# Patient Record
Sex: FEMALE | Race: WHITE | Employment: STUDENT | URBAN - NONMETROPOLITAN AREA
[De-identification: names, ages, dates, MRNs, and addresses within clinical notes are randomized per-mention and may not be internally consistent; named-entity substitution may affect disease eponyms.]

---
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
## 2021-11-17 ENCOUNTER — OFFICE VISIT (OUTPATIENT)
Dept: RHEUMATOLOGY | Age: 21
End: 2021-11-17
Payer: COMMERCIAL

## 2021-11-17 VITALS
HEART RATE: 92 BPM | SYSTOLIC BLOOD PRESSURE: 126 MMHG | DIASTOLIC BLOOD PRESSURE: 72 MMHG | BODY MASS INDEX: 53.92 KG/M2 | OXYGEN SATURATION: 97 % | WEIGHT: 293 LBS | HEIGHT: 62 IN

## 2021-11-17 DIAGNOSIS — L40.50 PSORIATIC ARTHRITIS (HCC): Primary | ICD-10-CM

## 2021-11-17 DIAGNOSIS — G89.29 CHRONIC BILATERAL LOW BACK PAIN WITHOUT SCIATICA: ICD-10-CM

## 2021-11-17 DIAGNOSIS — Z51.81 MEDICATION MONITORING ENCOUNTER: ICD-10-CM

## 2021-11-17 DIAGNOSIS — E66.01 MORBID OBESITY (HCC): ICD-10-CM

## 2021-11-17 DIAGNOSIS — M54.50 CHRONIC BILATERAL LOW BACK PAIN WITHOUT SCIATICA: ICD-10-CM

## 2021-11-17 DIAGNOSIS — L40.9 PSORIASIS: ICD-10-CM

## 2021-11-17 PROCEDURE — G8417 CALC BMI ABV UP PARAM F/U: HCPCS | Performed by: NURSE PRACTITIONER

## 2021-11-17 PROCEDURE — G8427 DOCREV CUR MEDS BY ELIG CLIN: HCPCS | Performed by: NURSE PRACTITIONER

## 2021-11-17 PROCEDURE — G8484 FLU IMMUNIZE NO ADMIN: HCPCS | Performed by: NURSE PRACTITIONER

## 2021-11-17 PROCEDURE — 99214 OFFICE O/P EST MOD 30 MIN: CPT | Performed by: NURSE PRACTITIONER

## 2021-11-17 PROCEDURE — 1036F TOBACCO NON-USER: CPT | Performed by: NURSE PRACTITIONER

## 2021-11-17 ASSESSMENT — ENCOUNTER SYMPTOMS
NAUSEA: 0
EYE ITCHING: 0
SHORTNESS OF BREATH: 1
DIARRHEA: 1
CONSTIPATION: 0
EYE PAIN: 0
BACK PAIN: 1
ABDOMINAL PAIN: 0
TROUBLE SWALLOWING: 0
COUGH: 0
WHEEZING: 1

## 2021-11-17 NOTE — PROGRESS NOTES
[facility] RHEUMATOLOGY FOLLOW UP NOTE       Date Of Service: 11/17/2021  Provider: LEONIDAS Zhou - BARBARA    Name: Edwin Toledo   MRN: [id number]    Chief Complaint(s)     Chief Complaint   Patient presents with    Follow-up        History of Present Illness (HPI)     Edwin Toledo  is a(n)21 y.o. female with a hx of psoriasis, migraine, insulin resistance, morbid obesity here for the f/u evaluation of psoriatic arthritis. Interval hx:    - has not been seen since initial evaluation on 6/15/2020   - increased joint pains for the past month   - saw dermatologist- gave topical cream which is not working-worsened psoriasis    pain affecting the left fingers, knees, ankles, back  Pain on a scale 0-10: 5.5/10  Type of pain: intermittent  Timing:mornings  Aggravating factors: pressure on joints  Alleviating factors: hot shower, cold compression, narpoxen    Associated symptoms:  + swelling/  Redness/ warmth (ankles), + AM stiffness lasting ~ 10 mins    Psoriasis active behind ears, scalp, arms, abdomen, butt, groin, legs, knees    REVIEW OF SYSTEMS: (ROS)    Review of Systems   Constitutional: Negative for fatigue, fever and unexpected weight change. HENT: Negative for congestion and trouble swallowing. Hair loss   Eyes: Negative for pain and itching. Respiratory: Positive for shortness of breath and wheezing. Negative for cough. Cardiovascular: Negative for chest pain and leg swelling. Gastrointestinal: Positive for diarrhea. Negative for abdominal pain, constipation and nausea. Endocrine: Negative for cold intolerance and heat intolerance. Genitourinary: Negative for difficulty urinating, frequency and urgency. Musculoskeletal: Positive for arthralgias, back pain, joint swelling and myalgias. Skin: Positive for rash. Neurological: Positive for numbness and headaches. Negative for dizziness and weakness. Psychiatric/Behavioral: Positive for sleep disturbance.  The patient is nervous/anxious. PAST MEDICAL HISTORY      Past Medical History:   Diagnosis Date    Insulin resistance     Psoriasis        PAST SURGICAL HISTORY      Past Surgical History:   Procedure Laterality Date    TONSILLECTOMY AND ADENOIDECTOMY      TYMPANOSTOMY TUBE PLACEMENT         FAMILY HISTORY      Family History   Problem Relation Age of Onset    Diabetes Mother     Diabetes Maternal Great Grandfather     Cancer Maternal Great Grandmother         unsure of kind    Cancer Maternal Uncle     Cancer Maternal Uncle         lymphoma       SOCIAL HISTORY      Social History     Tobacco History     Smoking Status  Never Smoker    Smokeless Tobacco Use  Never Used          Alcohol History     Alcohol Use Status  Never          Drug Use     Drug Use Status  Never          Sexual Activity     Sexually Active  Not Asked                ALLERGIES   No Known Allergies    CURRENT MEDICATIONS      Current Outpatient Medications   Medication Sig Dispense Refill    topiramate (TOPAMAX) 100 MG tablet TAKE 1 TABLET BY MOUTH TWICE DAILY FOR 30 DAYS      metFORMIN (GLUCOPHAGE-XR) 500 MG extended release tablet TAKE 1 TABLET BY MOUTH TWICE DAILY WITH EVENING MEAL FOR 30 DAYS      D3-50 1.25 MG (49452 UT) CAPS TAKE 1 CAPSULE BY MOUTH ONCE A WEEK      omeprazole (PRILOSEC) 10 MG delayed release capsule Take 10 mg by mouth daily      triamcinolone (KENALOG) 0.1 % cream Apply topically 2 times daily. 453.6 g 0     No current facility-administered medications for this visit. PHYSICAL EXAMINATION / OBJECTIVE   Objective:  /72 (Site: Right Lower Arm, Position: Sitting, Cuff Size: Medium Adult)   Ht 5' 2.01\" (1.575 m)   Wt (!) 368 lb (166.9 kg)   BMI 67.29 kg/m²     Physical Exam  Vitals reviewed. Constitutional:       Appearance: She is well-developed. Cardiovascular:      Rate and Rhythm: Normal rate and regular rhythm.    Pulmonary:      Effort: Pulmonary effort is normal.      Breath sounds: Normal breath sounds. Musculoskeletal:      Cervical back: Normal range of motion and neck supple. Skin:     General: Skin is warm and dry. Findings: Rash (erythematous plaques scalp, posterior ears, arms, legs, abdomen) present. Comments: Onycholysis nails   Neurological:      Mental Status: She is alert and oriented to person, place, and time. Deep Tendon Reflexes: Reflexes are normal and symmetric. Psychiatric:         Thought Content:  Thought content normal.       Upper extremities:    SHOULDERS tender bilat ,   ELBOWS nontender, no swelling,   WRISTS tender left, no swelling,   HANDS/FINGERS nontender, no swelling    Lower extremities:  HIPS tender bilat outer hips  KNEES tender bilat, no swelling  ANKLES nontender, no swelling   FEET : + MTP compression bilat       RAPID 3:   11/17/2021 --- RAPID 3: 1.7 + 5.5 + 0 = 7.2     Remission: <3  Low Disease Activity: <6  Moderate Disease Activity: >=6 and <=12  High Disease Activity: >12     LABS    CBC  Lab Results   Component Value Date    WBC 8.6 06/15/2020    RBC 4.90 06/15/2020    HGB 11.9 06/15/2020    HCT 39.4 06/15/2020    MCV 80.4 06/15/2020    MCH 24.3 06/15/2020    MCHC 30.2 06/15/2020     06/15/2020       CMP  Lab Results   Component Value Date    CALCIUM 9.6 06/15/2020    LABALBU 3.5 06/15/2020    PROT 7.3 06/15/2020     06/15/2020    K 3.8 06/15/2020    CO2 25 06/15/2020     06/15/2020    BUN 10 06/15/2020    CREATININE 0.6 06/15/2020    ALKPHOS 92 06/15/2020    ALT 18 06/15/2020    AST 15 06/15/2020       HgBA1c: No components found for: HGBA1C    No results found for: VITD25      No results found for: ANASCRN  No results found for: SSA  No results found for: SSB  No results found for: ANTI-SMITH  No results found for: DSDNAAB   No results found for: ANTIRNP  No results found for: C3, C4  No results found for: CCPAB  No results found for: RF    No components found for: CANCASCRN, APANCASCRN  Lab Results Component Value Date    SEDRATE 48 (H) 06/15/2020     Lab Results   Component Value Date    CRP 2.81 (H) 06/15/2020       RADIOLOGY:         ASSESSMENT/PLAN    Assessment   Plan     Psoriatic arthritis  - psoriasis, nail changes, arthralgia. Xrays hands with mod joint space narrowing PIPs. - plan to start methotrexate 10 mg PO weekly & folic acid 1 mg daily if labs stable. Side effects: GI intolerance, alopecia, fatigue, low blood counts, increased risk of infection. RARE: nodulosis, increased risk of lymphoma and non melanoma skin cancer. Discussed teratogenic effects and importance of birth control- patient currently with birth control implant. Psoriasis- active   - following with dermatology    Chronic midline low back pain  - xray lumbar spine with possible SI joints changes. Xray SI joints without abnormalities. Morbid obesity   - discussed importance of wt loss    Medication monitoring   - CBC, CMP, sed rate, CRP q 4 weeks x3 w/ MTX start          No follow-ups on file. Electronically signed by LEONIDAS Lu CNP on 11/17/2021 at 3:11 PM    New Prescriptions    No medications on file       Thank you for allowing me to participate in the care of this patient. Please call if there are any questions.

## 2021-11-19 LAB
ALBUMIN SERPL-MCNC: 3.6 G/DL
ALP BLD-CCNC: 82 U/L
ALT SERPL-CCNC: 19 U/L
ANION GAP SERPL CALCULATED.3IONS-SCNC: 14 MMOL/L
AST SERPL-CCNC: 14 U/L
BASOPHILS ABSOLUTE: 0.1 /ΜL
BASOPHILS RELATIVE PERCENT: 0.8 %
BILIRUB SERPL-MCNC: 0.3 MG/DL (ref 0.1–1.4)
BUN BLDV-MCNC: 15 MG/DL
C-REACTIVE PROTEIN: 2.48
CALCIUM SERPL-MCNC: 8.7 MG/DL
CHLORIDE BLD-SCNC: 106 MMOL/L
CO2: 21 MMOL/L
CREAT SERPL-MCNC: 0.57 MG/DL
EOSINOPHILS ABSOLUTE: 0.2 /ΜL
EOSINOPHILS RELATIVE PERCENT: 2.1 %
GFR CALCULATED: 60
GLUCOSE BLD-MCNC: 96 MG/DL
HCT VFR BLD CALC: 36.8 % (ref 36–46)
HEMOGLOBIN: 12.1 G/DL (ref 12–16)
LYMPHOCYTES ABSOLUTE: 2.2 /ΜL
LYMPHOCYTES RELATIVE PERCENT: 21.5 %
MCH RBC QN AUTO: 25 PG
MCHC RBC AUTO-ENTMCNC: 32.9 G/DL
MCV RBC AUTO: 75.8 FL
MONOCYTES ABSOLUTE: 0.5 /ΜL
MONOCYTES RELATIVE PERCENT: 4.9 %
NEUTROPHILS ABSOLUTE: 7.2 /ΜL
NEUTROPHILS RELATIVE PERCENT: 70.7 %
PDW BLD-RTO: 17.1 %
PLATELET # BLD: 295 K/ΜL
PMV BLD AUTO: 8.4 FL
POTASSIUM SERPL-SCNC: 4.2 MMOL/L
RBC # BLD: 4.85 10^6/ΜL
SEDIMENTATION RATE, ERYTHROCYTE: 49
SODIUM BLD-SCNC: 137 MMOL/L
TOTAL PROTEIN: 7
WBC # BLD: 10.2 10^3/ML

## 2021-11-23 RX ORDER — FOLIC ACID 1 MG/1
1 TABLET ORAL DAILY
Qty: 90 TABLET | Refills: 0 | Status: SHIPPED | OUTPATIENT
Start: 2021-11-23

## 2021-11-24 ENCOUNTER — TELEPHONE (OUTPATIENT)
Dept: RHEUMATOLOGY | Age: 21
End: 2021-11-24

## 2021-11-24 NOTE — TELEPHONE ENCOUNTER
----- Message from LEONIDAS Kendall CNP sent at 11/23/2021  9:32 AM EST -----  Inflammatory markers are elevated, otherwise labs stable. The prescription for the methotrexate and folic acid have been sent to the pharmacy. Repeat labs in 4 weeks x3.

## 2022-02-17 ENCOUNTER — TELEPHONE (OUTPATIENT)
Dept: RHEUMATOLOGY | Age: 22
End: 2022-02-17

## 2022-02-17 NOTE — TELEPHONE ENCOUNTER
Nely Floyd  VPM:[id number]/ Dr. Herb Laguna    Patient wants her lab orders to be sent to [facility] in hospitals and she would appreciate a call concerning her Methotrexate Dosage. Please advise patient.

## 2022-07-18 ENCOUNTER — OFFICE VISIT (OUTPATIENT)
Dept: RHEUMATOLOGY | Age: 22
End: 2022-07-18
Payer: COMMERCIAL

## 2022-07-18 VITALS
OXYGEN SATURATION: 97 % | BODY MASS INDEX: 53.92 KG/M2 | HEART RATE: 78 BPM | HEIGHT: 62 IN | SYSTOLIC BLOOD PRESSURE: 124 MMHG | DIASTOLIC BLOOD PRESSURE: 72 MMHG | WEIGHT: 293 LBS

## 2022-07-18 DIAGNOSIS — L40.50 PSORIATIC ARTHRITIS (HCC): Primary | ICD-10-CM

## 2022-07-18 DIAGNOSIS — G89.29 CHRONIC MIDLINE LOW BACK PAIN WITHOUT SCIATICA: ICD-10-CM

## 2022-07-18 DIAGNOSIS — Z51.81 MEDICATION MONITORING ENCOUNTER: ICD-10-CM

## 2022-07-18 DIAGNOSIS — M46.1 DEGENERATIVE JOINT DISEASE OF SACROILIAC JOINT (HCC): ICD-10-CM

## 2022-07-18 DIAGNOSIS — M54.50 CHRONIC MIDLINE LOW BACK PAIN WITHOUT SCIATICA: ICD-10-CM

## 2022-07-18 DIAGNOSIS — L40.9 PSORIASIS: ICD-10-CM

## 2022-07-18 DIAGNOSIS — E66.01 MORBID OBESITY (HCC): ICD-10-CM

## 2022-07-18 PROCEDURE — 99214 OFFICE O/P EST MOD 30 MIN: CPT | Performed by: INTERNAL MEDICINE

## 2022-07-18 PROCEDURE — G8417 CALC BMI ABV UP PARAM F/U: HCPCS | Performed by: INTERNAL MEDICINE

## 2022-07-18 PROCEDURE — G8427 DOCREV CUR MEDS BY ELIG CLIN: HCPCS | Performed by: INTERNAL MEDICINE

## 2022-07-18 PROCEDURE — 1036F TOBACCO NON-USER: CPT | Performed by: INTERNAL MEDICINE

## 2022-07-18 ASSESSMENT — ENCOUNTER SYMPTOMS
COUGH: 0
SHORTNESS OF BREATH: 0
VOMITING: 0
CONSTIPATION: 0
ABDOMINAL PAIN: 0
EYES NEGATIVE: 1
EYE REDNESS: 0
COLOR CHANGE: 0
WHEEZING: 0
GASTROINTESTINAL NEGATIVE: 1
EYE PAIN: 0
NAUSEA: 0
RESPIRATORY NEGATIVE: 1

## 2022-07-18 NOTE — PROGRESS NOTES
[facility] RHEUMATOLOGY FOLLOW UP NOTE       Date Of Service: 7/18/2022  Provider: Broderick Amato DO ,   PCP: Yohannes Meeks MD   Name: Moses Fragoso   MRN: [id number]        History of Present Illness (HPI)     Chief Complaint   Patient presents with    Follow-up     8 month f/u PSA         Lucinda Gonzalez  is a(n)21 y.o. female with a hx of psoriasis, migraine, insulin resistance, morbid obesity here for the f/u evaluation of psoriatic arthritis. Was on Methotrexate 10mg po weekly - helped with skin -- but the medication was not refilles. Psoriasis  -- Continued redness / macular plaques along the arms, abominal regions, bilat hips. - + assocaited itching. Arthralgia pain up to 10/10 over the past week with constant pain in the bilat ankles, and lower back. Pain in the Right shoulder, bilat hips, left knee, right ankle, and neck. Timing: evenings while trying to sleep. Back pain waking patient. Aggravating factors: pressure on joints. Legs & feet - walking/wt bearing. Alleviating factors: hot shower, cold compression, narpoxen    - legs feel like they are giving out with initial wt bearing, and occasion left knee instability   - denies incontinence. REVIEW OF SYSTEMS: (ROS)    Review of Systems   Constitutional:  Positive for fatigue. Negative for fever and unexpected weight change. HENT:  Positive for tinnitus. Negative for congestion and mouth sores. Eyes: Negative. Negative for pain and redness. Respiratory: Negative. Negative for cough, shortness of breath and wheezing. Cardiovascular:  Positive for leg swelling. Negative for chest pain. Gastrointestinal: Negative. Negative for abdominal pain, constipation, nausea and vomiting. Endocrine: Negative for polyuria. Genitourinary: Negative. Negative for difficulty urinating, frequency and hematuria. Skin: Negative. Negative for color change and rash. Neurological: Negative.   Negative for dizziness, weakness, numbness and headaches. Hematological: Negative. Negative for adenopathy. Does not bruise/bleed easily. Psychiatric/Behavioral:  Positive for sleep disturbance. Negative for agitation. The patient is nervous/anxious. PmHx:  has a past medical history of Insulin resistance and Psoriasis. Social History:  reports that she has never smoked. She has never used smokeless tobacco. She reports that she does not drink alcohol and does not use drugs. No Known Allergies    CURRENT MEDICATIONS      Current Outpatient Medications:     topiramate (TOPAMAX) 100 MG tablet, TAKE 1 TABLET BY MOUTH TWICE DAILY FOR 30 DAYS, Disp: , Rfl:     metFORMIN (GLUCOPHAGE-XR) 500 MG extended release tablet, TAKE 1 TABLET BY MOUTH TWICE DAILY WITH EVENING MEAL FOR 30 DAYS, Disp: , Rfl:     D3-50 1.25 MG (05001 UT) CAPS, TAKE 1 CAPSULE BY MOUTH ONCE A WEEK, Disp: , Rfl:     omeprazole (PRILOSEC) 10 MG delayed release capsule, Take 10 mg by mouth daily, Disp: , Rfl:     triamcinolone (KENALOG) 0.1 % cream, Apply topically 2 times daily. , Disp: 453.6 g, Rfl: 0    methotrexate (RHEUMATREX) 2.5 MG chemo tablet, Take 4 tablets by mouth once a week (Patient not taking: Reported on 7/18/2022), Disp: 16 tablet, Rfl: 0    folic acid (FOLVITE) 1 MG tablet, Take 1 tablet by mouth daily (Patient not taking: Reported on 7/18/2022), Disp: 90 tablet, Rfl: 0      PHYSICAL EXAMINATION / OBJECTIVE     Objective:  /72 (Site: Left Lower Arm, Position: Sitting, Cuff Size: Medium Adult)   Pulse 78   Ht 5' 2.01\" (1.575 m)   Wt (!) 366 lb (166 kg)   SpO2 97%   BMI 66.93 kg/m²     Physical Exam      General Appearance:  AAO x 3 ,  well-developed and well nourished , obeses. Head: NCAT  Eyes: No abnormalities. ,  Sclera non-icteric,   Ears / Nose:  normal  appearance  ears and nose.   No active drainage   Mouth:  MMM, ears without deformities  Neck: No jugular venous distention, appears symmetric, good ROM  Lymph:  no adenopathy Pulmonary/Chest: CTA bilateral ,  symmetric chest expansion. Cardiovascular: Normal S1 and S2, NO murmur, rub, gallop  : Deferred   Abd/GI: Deferred   Neurologic: Speech normal, no facial droop,  Skin:     + red plaques in the scalp, posterior ears, abdominal wall, anterior knees. Diffused redness bilat upper arms. +Onycholysis     Musculoskeletal:  Upper extremities:    Shoulder -tender left   Elbows: tender right lateral epicondyle. Wrsist / hands - Non-tender. Lower extremities:  Hips, knees, ankles - Non-tender, No swelling   FEET : dactylitis bilat feet with tender # 2. Pain     Spine: tender lumbosacral spine      Exam KEY:   Tender : T    Swelling: S,   Deformities: D,   Non-tender : NT  ,  No swelling: NS            LABS      Lab Results   Component Value Date    WBC 10.2 11/19/2021    HGB 12.1 11/19/2021    HGB 11.9 (L) 06/15/2020    MCV 75.8 11/19/2021    MCHC 32.9 11/19/2021    RDW 17.1 11/19/2021     11/19/2021     06/15/2020    NEUTROABS 7.2 11/19/2021    LYMPHSABS 2.2 11/19/2021    LYMPHSABS 1.7 06/15/2020    EOSABS 0.2 11/19/2021    BASOSABS 0.1 11/19/2021         Chemistry        Component Value Date/Time     11/19/2021 0000    K 4.2 11/19/2021 0000     11/19/2021 0000    CO2 21 11/19/2021 0000    BUN 15 11/19/2021 0000    CREATININE 0.57 11/19/2021 0000        Component Value Date/Time    CALCIUM 8.7 11/19/2021 0000    ALKPHOS 82 11/19/2021 0000    AST 14 11/19/2021 0000    ALT 19 11/19/2021 0000    BILITOT 0.3 11/19/2021 0000            Lab Results   Component Value Date    SEDRATE 49 11/19/2021    SEDRATE 48 (H) 06/15/2020    CRP 2.48 11/19/2021    CRP 2.81 (H) 06/15/2020       No results found for: VITD25    No results found for: C3, C4]  No results found for: ANASCRN, SSA, SSB, ANTI-SMITH, DSDNAAB, ANTIRNP  No results found for: C3, C4  No results found for: CCPAB, RF      RADIOLOGY / PROCEDURES:       ASSESSMENT/PLAN:     1.  Psori[facility] arthritis ([facility]

## 2022-07-19 ENCOUNTER — TELEPHONE (OUTPATIENT)
Dept: RHEUMATOLOGY | Age: 22
End: 2022-07-19

## 2022-07-19 DIAGNOSIS — L40.50 PSORIATIC ARTHRITIS (HCC): Primary | ICD-10-CM

## 2022-07-19 DIAGNOSIS — L40.9 PSORIASIS: ICD-10-CM

## 2022-07-19 NOTE — TELEPHONE ENCOUNTER
Patient was seen in the office yesterday and stated she could be placed on light duty per Dr. Kiki Shoemaker. She is asking for a letter to confirm this for [employer] I looked over the notes and didn't see any info inquiring light work duty.

## 2022-07-26 DIAGNOSIS — G89.29 CHRONIC MIDLINE LOW BACK PAIN WITHOUT SCIATICA: ICD-10-CM

## 2022-07-26 DIAGNOSIS — M46.1 DEGENERATIVE JOINT DISEASE OF SACROILIAC JOINT (HCC): ICD-10-CM

## 2022-07-26 DIAGNOSIS — L40.50 PSORIATIC ARTHRITIS (HCC): ICD-10-CM

## 2022-07-26 DIAGNOSIS — L40.9 PSORIASIS: ICD-10-CM

## 2022-07-26 DIAGNOSIS — M54.50 CHRONIC MIDLINE LOW BACK PAIN WITHOUT SCIATICA: ICD-10-CM

## 2022-07-27 ENCOUNTER — TELEPHONE (OUTPATIENT)
Dept: RHEUMATOLOGY | Age: 22
End: 2022-07-27

## 2022-07-27 NOTE — TELEPHONE ENCOUNTER
----- Message from Christiane Angulo DO sent at 7/26/2022  6:00 PM EDT -----  The x-ray of the sacroiliac joints revealed no significant abnormalities. If the back pain persists we may need to pursue an MRI to further evaluate for inflammation within the sacroiliac joints.

## 2022-07-27 NOTE — TELEPHONE ENCOUNTER
----- Message from LEONIDAS Garcia CNP sent at 7/26/2022  4:16 PM EDT -----  Testing for prior tuberculosis exposure was negative. Please work on the prior authorization for humira 40 mg subcu q 2 weeks.

## 2022-08-03 ENCOUNTER — TELEPHONE (OUTPATIENT)
Dept: PHARMACY | Facility: CLINIC | Age: 22
End: 2022-08-03

## 2022-08-04 ENCOUNTER — TELEPHONE (OUTPATIENT)
Dept: PHARMACY | Facility: CLINIC | Age: 22
End: 2022-08-04

## 2022-08-04 RX ORDER — ADALIMUMAB 40MG/0.4ML
40 KIT SUBCUTANEOUS
Qty: 2 EACH | Refills: 5 | Status: SHIPPED | OUTPATIENT
Start: 2022-08-04

## 2023-06-15 LAB
ALBUMIN SERPL-MCNC: 3.8 G/DL
ALP BLD-CCNC: 88 U/L
ALT SERPL-CCNC: 19 U/L
ANION GAP SERPL CALCULATED.3IONS-SCNC: 15 MMOL/L
AST SERPL-CCNC: 15 U/L
BASOPHILS ABSOLUTE: 0.1 /ΜL
BASOPHILS RELATIVE PERCENT: 0.6 %
BILIRUB SERPL-MCNC: 0.5 MG/DL (ref 0.1–1.4)
BUN BLDV-MCNC: 14 MG/DL
C-REACTIVE PROTEIN: 6.78
CALCIUM SERPL-MCNC: 9.4 MG/DL
CHLORIDE BLD-SCNC: 103 MMOL/L
CO2: 22 MMOL/L
CREAT SERPL-MCNC: 0.69 MG/DL
EGFR: 60
EOSINOPHILS ABSOLUTE: 0.1 /ΜL
EOSINOPHILS RELATIVE PERCENT: 1 %
GLUCOSE BLD-MCNC: 91 MG/DL
HCT VFR BLD CALC: 38.2 % (ref 36–46)
HEMOGLOBIN: 12.9 G/DL (ref 12–16)
LYMPHOCYTES ABSOLUTE: 2 /ΜL
LYMPHOCYTES RELATIVE PERCENT: 18.1 %
MCH RBC QN AUTO: 26.3 PG
MCHC RBC AUTO-ENTMCNC: 33.7 G/DL
MCV RBC AUTO: 77.9 FL
MONOCYTES ABSOLUTE: 0.5 /ΜL
MONOCYTES RELATIVE PERCENT: 4.2 %
NEUTROPHILS ABSOLUTE: 8.6 /ΜL
NEUTROPHILS RELATIVE PERCENT: 76.1 %
PDW BLD-RTO: 15.7 %
PLATELET # BLD: 293 K/ΜL
PMV BLD AUTO: 9.3 FL
POTASSIUM SERPL-SCNC: 4.1 MMOL/L
RBC # BLD: 4.91 10^6/ΜL
SEDIMENTATION RATE, ERYTHROCYTE: 59
SODIUM BLD-SCNC: 136 MMOL/L
TOTAL PROTEIN: 7.9
WBC # BLD: 11.2 10^3/ML

## 2023-06-19 ENCOUNTER — TELEPHONE (OUTPATIENT)
Dept: RHEUMATOLOGY | Age: 23
End: 2023-06-19

## 2023-06-19 NOTE — TELEPHONE ENCOUNTER
----- Message from Julieta Albright DO sent at 6/16/2023 10:56 AM EDT -----  Lab testing to help evaluate for systemic inflammation were elevated/abnormal.  Prior to refilling the methotrexate we will need to have a follow-up office appointment.

## 2024-08-13 ENCOUNTER — OFFICE VISIT (OUTPATIENT)
Dept: OBGYN CLINIC | Age: 24
End: 2024-08-13
Payer: COMMERCIAL

## 2024-08-13 VITALS
SYSTOLIC BLOOD PRESSURE: 134 MMHG | BODY MASS INDEX: 53.92 KG/M2 | HEIGHT: 62 IN | WEIGHT: 293 LBS | DIASTOLIC BLOOD PRESSURE: 88 MMHG

## 2024-08-13 DIAGNOSIS — N92.1 BREAKTHROUGH BLEEDING ON NEXPLANON: Primary | ICD-10-CM

## 2024-08-13 DIAGNOSIS — N94.6 DYSMENORRHEA: ICD-10-CM

## 2024-08-13 DIAGNOSIS — Z97.5 BREAKTHROUGH BLEEDING ON NEXPLANON: Primary | ICD-10-CM

## 2024-08-13 PROCEDURE — 99203 OFFICE O/P NEW LOW 30 MIN: CPT | Performed by: NURSE PRACTITIONER

## 2024-08-13 PROCEDURE — G8427 DOCREV CUR MEDS BY ELIG CLIN: HCPCS | Performed by: NURSE PRACTITIONER

## 2024-08-13 PROCEDURE — G8417 CALC BMI ABV UP PARAM F/U: HCPCS | Performed by: NURSE PRACTITIONER

## 2024-08-13 PROCEDURE — 1036F TOBACCO NON-USER: CPT | Performed by: NURSE PRACTITIONER

## 2024-08-13 RX ORDER — APREMILAST 30 MG/1
TABLET, FILM COATED ORAL
COMMUNITY
Start: 2024-05-08

## 2024-08-13 RX ORDER — RIZATRIPTAN BENZOATE 10 MG/1
TABLET ORAL
COMMUNITY

## 2024-08-13 RX ORDER — PROPRANOLOL HYDROCHLORIDE 10 MG/1
10 TABLET ORAL 2 TIMES DAILY
COMMUNITY
Start: 2024-07-16

## 2024-08-13 RX ORDER — FLUOXETINE HYDROCHLORIDE 20 MG/1
20 CAPSULE ORAL DAILY
COMMUNITY
Start: 2024-07-19

## 2024-08-13 RX ORDER — TIOTROPIUM BROMIDE 18 UG/1
CAPSULE ORAL; RESPIRATORY (INHALATION) DAILY
COMMUNITY
Start: 2024-07-19

## 2024-08-13 NOTE — PROGRESS NOTES
PROBLEM VISIT     Date of service: 2024    Lucinda Buchanan  Is a 23 y.o. female    PT's PCP is: None, None     : 2000                                             Subjective:       No LMP recorded.   OB History    Para Term  AB Living   0 0 0 0 0 0   SAB IAB Ectopic Molar Multiple Live Births   0 0 0 0 0 0        Social History     Tobacco Use   Smoking Status Never   Smokeless Tobacco Never        Social History     Substance and Sexual Activity   Alcohol Use Never       Allergies: Patient has no known allergies.      Current Outpatient Medications:     SUMAtriptan Succinate (IMITREX PO), Take by mouth, Disp: , Rfl:     propranolol (INDERAL) 10 MG tablet, Take 1 tablet by mouth 2 times daily, Disp: , Rfl:     OTEZLA 30 MG TABS, , Disp: , Rfl:     FLUoxetine (PROZAC) 20 MG capsule, Take 1 capsule by mouth daily, Disp: , Rfl:     rizatriptan (MAXALT) 10 MG tablet, TAKE 1 TABLET BY MOUTH ONCE DAILY AS NEEDED FOR MIGRAINE HEADACHE, MAY REPEAT DOSE EVERY 2 HOURS UP TO A MAXIMUM OF 30MG IN 24 HOURS, Disp: , Rfl:     SPIRIVA HANDIHALER 18 MCG inhalation capsule, Inhale into the lungs daily, Disp: , Rfl:     ALBUTEROL IN, Inhale into the lungs, Disp: , Rfl:     metFORMIN (GLUCOPHAGE-XR) 500 MG extended release tablet, TAKE 1 TABLET BY MOUTH TWICE DAILY WITH EVENING MEAL FOR 30 DAYS, Disp: , Rfl:     omeprazole (PRILOSEC) 10 MG delayed release capsule, Take 1 capsule by mouth daily, Disp: , Rfl:     Social History     Substance and Sexual Activity   Sexual Activity Never         Chief Complaint   Patient presents with    New Patient     Patient here with her grandmother, Daniella. currently has nexplanon that expires in .  She is wanting to get replaced at that time.     Vaginal Bleeding     Patient reports irregular vaginal bleeding.  Currently been spotting for 10 days. Sometimes she will bleeding for a week and stop for just 3 days.  She then may not bleed for a month. She has

## 2024-08-23 ASSESSMENT — ENCOUNTER SYMPTOMS: RESPIRATORY NEGATIVE: 1

## 2024-11-06 ENCOUNTER — OFFICE VISIT (OUTPATIENT)
Dept: OBGYN CLINIC | Age: 24
End: 2024-11-06
Payer: COMMERCIAL

## 2024-11-06 VITALS — SYSTOLIC BLOOD PRESSURE: 118 MMHG | DIASTOLIC BLOOD PRESSURE: 82 MMHG | WEIGHT: 293 LBS | BODY MASS INDEX: 63.14 KG/M2

## 2024-11-06 DIAGNOSIS — N92.1 BREAKTHROUGH BLEEDING ON NEXPLANON: Primary | ICD-10-CM

## 2024-11-06 DIAGNOSIS — N94.6 DYSMENORRHEA: ICD-10-CM

## 2024-11-06 DIAGNOSIS — Z97.5 BREAKTHROUGH BLEEDING ON NEXPLANON: Primary | ICD-10-CM

## 2024-11-06 PROCEDURE — G8484 FLU IMMUNIZE NO ADMIN: HCPCS | Performed by: NURSE PRACTITIONER

## 2024-11-06 PROCEDURE — 99213 OFFICE O/P EST LOW 20 MIN: CPT | Performed by: NURSE PRACTITIONER

## 2024-11-06 PROCEDURE — G8427 DOCREV CUR MEDS BY ELIG CLIN: HCPCS | Performed by: NURSE PRACTITIONER

## 2024-11-06 PROCEDURE — G8417 CALC BMI ABV UP PARAM F/U: HCPCS | Performed by: NURSE PRACTITIONER

## 2024-11-06 PROCEDURE — 1036F TOBACCO NON-USER: CPT | Performed by: NURSE PRACTITIONER

## 2024-11-06 RX ORDER — DULAGLUTIDE 0.75 MG/.5ML
INJECTION, SOLUTION SUBCUTANEOUS
COMMUNITY
Start: 2024-10-23

## 2024-11-06 RX ORDER — LORATADINE 10 MG/1
TABLET ORAL
COMMUNITY
Start: 2024-09-27

## 2024-11-06 RX ORDER — IBUPROFEN 800 MG/1
800 TABLET, FILM COATED ORAL 3 TIMES DAILY PRN
COMMUNITY
Start: 2024-10-18

## 2024-11-06 RX ORDER — TIOTROPIUM BROMIDE INHALATION SPRAY 1.56 UG/1
SPRAY, METERED RESPIRATORY (INHALATION)
COMMUNITY
Start: 2024-09-26

## 2024-11-06 RX ORDER — CHOLECALCIFEROL (VITAMIN D3) 1250 MCG
1 CAPSULE ORAL WEEKLY
COMMUNITY
Start: 2024-10-23

## 2024-11-06 NOTE — PROGRESS NOTES
PROBLEM VISIT     Date of service: 2024    Lucinda Buchanan  Is a 24 y.o. female    PT's PCP is: None, None     : 2000                                             Subjective:       Patient's last menstrual period was 10/28/2024 (approximate).   OB History    Para Term  AB Living   0 0 0 0 0 0   SAB IAB Ectopic Molar Multiple Live Births   0 0 0 0 0 0        Social History     Tobacco Use   Smoking Status Never   Smokeless Tobacco Never        Social History     Substance and Sexual Activity   Alcohol Use Never       Allergies: Patient has no known allergies.      Current Outpatient Medications:     SPIRIVA RESPIMAT 1.25 MCG/ACT AERS inhaler, INHALE 2 SPRAY(S) BY MOUTH ONCE DAILY, Disp: , Rfl:     TRULICITY 0.75 MG/0.5ML SOAJ SC injection, , Disp: , Rfl:     Cholecalciferol (VITAMIN D3) 1.25 MG (53389 UT) CAPS, Take 1 capsule by mouth Once a week at 5 PM, Disp: , Rfl:     ibuprofen (ADVIL;MOTRIN) 800 MG tablet, Take 1 tablet by mouth 3 times daily as needed for Pain, Disp: , Rfl:     loratadine (CLARITIN) 10 MG tablet, TAKE 1 TABLET BY MOUTH ONCE DAILY FOR 14 DAYS, Disp: , Rfl:     SUMAtriptan Succinate (IMITREX PO), Take by mouth, Disp: , Rfl:     propranolol (INDERAL) 10 MG tablet, Take 1 tablet by mouth 2 times daily, Disp: , Rfl:     OTEZLA 30 MG TABS, , Disp: , Rfl:     FLUoxetine (PROZAC) 20 MG capsule, Take 1 capsule by mouth daily, Disp: , Rfl:     ALBUTEROL IN, Inhale into the lungs, Disp: , Rfl:     omeprazole (PRILOSEC) 10 MG delayed release capsule, Take 1 capsule by mouth daily, Disp: , Rfl:     Social History     Substance and Sexual Activity   Sexual Activity Never         Chief Complaint   Patient presents with    Dysmenorrhea     Follow up usn.         PE:  Vital Signs  Blood pressure 118/82, weight (!) 157.9 kg (348 lb), last menstrual period 10/28/2024.     HPI: Patient presents today following ultrasound for BTB on Nexplanon. She admits to having very irregular

## 2024-11-20 ENCOUNTER — PROCEDURE VISIT (OUTPATIENT)
Dept: OBGYN CLINIC | Age: 24
End: 2024-11-20
Payer: COMMERCIAL

## 2024-11-20 VITALS — SYSTOLIC BLOOD PRESSURE: 122 MMHG | BODY MASS INDEX: 63.27 KG/M2 | DIASTOLIC BLOOD PRESSURE: 82 MMHG | WEIGHT: 293 LBS

## 2024-11-20 DIAGNOSIS — N94.6 DYSMENORRHEA: Primary | ICD-10-CM

## 2024-11-20 DIAGNOSIS — Z30.46 ENCOUNTER FOR REMOVAL AND REINSERTION OF NEXPLANON: ICD-10-CM

## 2024-11-20 LAB
CONTROL: NORMAL
PREGNANCY TEST URINE, POC: NEGATIVE

## 2024-11-20 PROCEDURE — 11983 REMOVE/INSERT DRUG IMPLANT: CPT | Performed by: NURSE PRACTITIONER

## 2024-11-20 PROCEDURE — 81025 URINE PREGNANCY TEST: CPT | Performed by: NURSE PRACTITIONER

## 2024-11-20 RX ORDER — LIDOCAINE HYDROCHLORIDE AND EPINEPHRINE BITARTRATE 20; .01 MG/ML; MG/ML
1.7 INJECTION, SOLUTION SUBCUTANEOUS ONCE
Status: COMPLETED | OUTPATIENT
Start: 2024-11-20 | End: 2024-11-20

## 2024-11-20 RX ADMIN — LIDOCAINE HYDROCHLORIDE AND EPINEPHRINE BITARTRATE 1.7 ML: 20; .01 INJECTION, SOLUTION SUBCUTANEOUS at 09:23

## 2024-11-20 RX ADMIN — LIDOCAINE HYDROCHLORIDE AND EPINEPHRINE BITARTRATE 1.7 ML: 20; .01 INJECTION, SOLUTION SUBCUTANEOUS at 09:00

## 2024-11-20 NOTE — PROGRESS NOTES
24 y.o.  11/20/2024      Lucinda Buchanan is a 24 y.o. female who presents today to have her Nexplanon removed and replaced. She does not have any other problems today       Past Medical History:   Diagnosis Date    Anxiety     Arthritis     Insulin resistance     Migraines     Psoriasis          Past Surgical History:   Procedure Laterality Date    TONSILLECTOMY AND ADENOIDECTOMY      TYMPANOSTOMY TUBE PLACEMENT         Family History   Problem Relation Age of Onset    Peripheral Vascular Disease Mother     Diabetes Mother     Deep Vein Thrombosis Mother     Cancer Maternal Uncle     Cancer Maternal Uncle         lymphoma    Diabetes Maternal Great Grandfather     Cancer Maternal Great Grandmother         unsure of kind       Social History     Tobacco Use    Smoking status: Never    Smokeless tobacco: Never   Vaping Use    Vaping status: Never Used   Substance Use Topics    Alcohol use: Never    Drug use: Never       Current Outpatient Medications on File Prior to Visit   Medication Sig Dispense Refill    SPIRIVA RESPIMAT 1.25 MCG/ACT AERS inhaler INHALE 2 SPRAY(S) BY MOUTH ONCE DAILY      TRULICITY 0.75 MG/0.5ML SOAJ SC injection       Cholecalciferol (VITAMIN D3) 1.25 MG (49544 UT) CAPS Take 1 capsule by mouth Once a week at 5 PM      ibuprofen (ADVIL;MOTRIN) 800 MG tablet Take 1 tablet by mouth 3 times daily as needed for Pain      loratadine (CLARITIN) 10 MG tablet TAKE 1 TABLET BY MOUTH ONCE DAILY FOR 14 DAYS      SUMAtriptan Succinate (IMITREX PO) Take by mouth      propranolol (INDERAL) 10 MG tablet Take 1 tablet by mouth 2 times daily      OTEZLA 30 MG TABS       FLUoxetine (PROZAC) 20 MG capsule Take 1 capsule by mouth daily      ALBUTEROL IN Inhale into the lungs      omeprazole (PRILOSEC) 10 MG delayed release capsule Take 1 capsule by mouth daily       No current facility-administered medications on file prior to visit.       Allergies as of 11/20/2024    (No Known Allergies)         OB History

## 2024-12-16 ENCOUNTER — OFFICE VISIT (OUTPATIENT)
Dept: OBGYN CLINIC | Age: 24
End: 2024-12-16
Payer: COMMERCIAL

## 2024-12-16 VITALS
DIASTOLIC BLOOD PRESSURE: 76 MMHG | HEIGHT: 62 IN | SYSTOLIC BLOOD PRESSURE: 118 MMHG | WEIGHT: 293 LBS | BODY MASS INDEX: 53.92 KG/M2

## 2024-12-16 DIAGNOSIS — B37.2 CANDIDAL SKIN INFECTION: Primary | ICD-10-CM

## 2024-12-16 PROCEDURE — G8427 DOCREV CUR MEDS BY ELIG CLIN: HCPCS | Performed by: NURSE PRACTITIONER

## 2024-12-16 PROCEDURE — G8484 FLU IMMUNIZE NO ADMIN: HCPCS | Performed by: NURSE PRACTITIONER

## 2024-12-16 PROCEDURE — G8417 CALC BMI ABV UP PARAM F/U: HCPCS | Performed by: NURSE PRACTITIONER

## 2024-12-16 PROCEDURE — 1036F TOBACCO NON-USER: CPT | Performed by: NURSE PRACTITIONER

## 2024-12-16 PROCEDURE — 99214 OFFICE O/P EST MOD 30 MIN: CPT | Performed by: NURSE PRACTITIONER

## 2024-12-16 RX ORDER — FLUCONAZOLE 150 MG/1
150 TABLET ORAL
Qty: 3 TABLET | Refills: 0 | Status: SHIPPED | OUTPATIENT
Start: 2024-12-16 | End: 2024-12-25

## 2024-12-16 ASSESSMENT — ENCOUNTER SYMPTOMS
RESPIRATORY NEGATIVE: 1
GASTROINTESTINAL NEGATIVE: 1

## 2024-12-16 NOTE — PROGRESS NOTES
PROBLEM VISIT     Date of service: 2024    Lucinda Buchanan  Is a 24 y.o. female    PT's PCP is: None, None     : 2000                                             Subjective:       No LMP recorded.   OB History    Para Term  AB Living   0 0 0 0 0 0   SAB IAB Ectopic Molar Multiple Live Births   0 0 0 0 0 0        Social History     Tobacco Use   Smoking Status Never   Smokeless Tobacco Never        Social History     Substance and Sexual Activity   Alcohol Use Never       Allergies: Patient has no known allergies.      Current Outpatient Medications:     fluconazole (DIFLUCAN) 150 MG tablet, Take 1 tablet by mouth every 72 hours for 9 days, Disp: 3 tablet, Rfl: 0    SPIRIVA RESPIMAT 1.25 MCG/ACT AERS inhaler, INHALE 2 SPRAY(S) BY MOUTH ONCE DAILY, Disp: , Rfl:     TRULICITY 0.75 MG/0.5ML SOAJ SC injection, , Disp: , Rfl:     Cholecalciferol (VITAMIN D3) 1.25 MG (60070 UT) CAPS, Take 1 capsule by mouth Once a week at 5 PM, Disp: , Rfl:     ibuprofen (ADVIL;MOTRIN) 800 MG tablet, Take 1 tablet by mouth 3 times daily as needed for Pain, Disp: , Rfl:     loratadine (CLARITIN) 10 MG tablet, TAKE 1 TABLET BY MOUTH ONCE DAILY FOR 14 DAYS, Disp: , Rfl:     SUMAtriptan Succinate (IMITREX PO), Take by mouth, Disp: , Rfl:     propranolol (INDERAL) 10 MG tablet, Take 1 tablet by mouth 2 times daily, Disp: , Rfl:     OTEZLA 30 MG TABS, , Disp: , Rfl:     FLUoxetine (PROZAC) 20 MG capsule, Take 1 capsule by mouth daily, Disp: , Rfl:     ALBUTEROL IN, Inhale into the lungs, Disp: , Rfl:     omeprazole (PRILOSEC) 10 MG delayed release capsule, Take 1 capsule by mouth daily, Disp: , Rfl:     Social History     Substance and Sexual Activity   Sexual Activity Never         Chief Complaint   Patient presents with    Rash     C/O painful, itchy rash on groin x 2 wks. Has been using A&D ointment without relief.         PE:  Vital Signs  Blood pressure 118/76, height 1.581 m (5' 2.25\"), weight (!) 160.1

## 2025-06-27 SDOH — ECONOMIC STABILITY: FOOD INSECURITY: WITHIN THE PAST 12 MONTHS, YOU WORRIED THAT YOUR FOOD WOULD RUN OUT BEFORE YOU GOT MONEY TO BUY MORE.: NEVER TRUE

## 2025-06-27 SDOH — ECONOMIC STABILITY: FOOD INSECURITY: WITHIN THE PAST 12 MONTHS, THE FOOD YOU BOUGHT JUST DIDN'T LAST AND YOU DIDN'T HAVE MONEY TO GET MORE.: NEVER TRUE

## 2025-06-27 SDOH — ECONOMIC STABILITY: INCOME INSECURITY: IN THE LAST 12 MONTHS, WAS THERE A TIME WHEN YOU WERE NOT ABLE TO PAY THE MORTGAGE OR RENT ON TIME?: NO

## 2025-06-27 SDOH — ECONOMIC STABILITY: TRANSPORTATION INSECURITY
IN THE PAST 12 MONTHS, HAS LACK OF TRANSPORTATION KEPT YOU FROM MEETINGS, WORK, OR FROM GETTING THINGS NEEDED FOR DAILY LIVING?: NO

## 2025-06-27 SDOH — ECONOMIC STABILITY: TRANSPORTATION INSECURITY
IN THE PAST 12 MONTHS, HAS THE LACK OF TRANSPORTATION KEPT YOU FROM MEDICAL APPOINTMENTS OR FROM GETTING MEDICATIONS?: NO

## 2025-06-27 ASSESSMENT — PATIENT HEALTH QUESTIONNAIRE - PHQ9
SUM OF ALL RESPONSES TO PHQ QUESTIONS 1-9: 0
1. LITTLE INTEREST OR PLEASURE IN DOING THINGS: NOT AT ALL
SUM OF ALL RESPONSES TO PHQ QUESTIONS 1-9: 0
2. FEELING DOWN, DEPRESSED OR HOPELESS: NOT AT ALL
SUM OF ALL RESPONSES TO PHQ QUESTIONS 1-9: 0
SUM OF ALL RESPONSES TO PHQ QUESTIONS 1-9: 0

## 2025-06-30 ASSESSMENT — PATIENT HEALTH QUESTIONNAIRE - PHQ9
2. FEELING DOWN, DEPRESSED OR HOPELESS: NOT AT ALL
1. LITTLE INTEREST OR PLEASURE IN DOING THINGS: NOT AT ALL
SUM OF ALL RESPONSES TO PHQ9 QUESTIONS 1 & 2: 0

## 2025-07-02 ENCOUNTER — HOSPITAL ENCOUNTER (OUTPATIENT)
Age: 25
Setting detail: SPECIMEN
Discharge: HOME | End: 2025-07-02

## 2025-07-02 ENCOUNTER — OFFICE VISIT (OUTPATIENT)
Dept: OBGYN CLINIC | Age: 25
End: 2025-07-02
Payer: COMMERCIAL

## 2025-07-02 VITALS — HEIGHT: 62 IN | DIASTOLIC BLOOD PRESSURE: 72 MMHG | SYSTOLIC BLOOD PRESSURE: 120 MMHG | BODY MASS INDEX: 64.05 KG/M2

## 2025-07-02 DIAGNOSIS — N93.9 ABNORMAL UTERINE BLEEDING (AUB): ICD-10-CM

## 2025-07-02 DIAGNOSIS — N93.9 ABNORMAL UTERINE BLEEDING (AUB): Primary | ICD-10-CM

## 2025-07-02 LAB
BASOPHILS # BLD: 0.05 K/UL (ref 0–0.2)
BASOPHILS NFR BLD: 1 % (ref 0–2)
EOSINOPHIL # BLD: 0.08 K/UL (ref 0–0.44)
EOSINOPHILS RELATIVE PERCENT: 1 % (ref 1–4)
ERYTHROCYTE [DISTWIDTH] IN BLOOD BY AUTOMATED COUNT: 15.6 % (ref 11.8–14.4)
HCT VFR BLD AUTO: 39.7 % (ref 36.3–47.1)
HGB BLD-MCNC: 12.6 G/DL (ref 11.9–15.1)
IMM GRANULOCYTES # BLD AUTO: 0.03 K/UL (ref 0–0.3)
IMM GRANULOCYTES NFR BLD: 0 %
IRON SATN MFR SERPL: 12 % (ref 20–55)
IRON SERPL-MCNC: 36 UG/DL (ref 37–145)
LYMPHOCYTES NFR BLD: 2.53 K/UL (ref 1.1–3.7)
LYMPHOCYTES RELATIVE PERCENT: 26 % (ref 24–43)
MCH RBC QN AUTO: 25.9 PG (ref 25.2–33.5)
MCHC RBC AUTO-ENTMCNC: 31.7 G/DL (ref 28.4–34.8)
MCV RBC AUTO: 81.5 FL (ref 82.6–102.9)
MONOCYTES NFR BLD: 0.56 K/UL (ref 0.1–1.2)
MONOCYTES NFR BLD: 6 % (ref 3–12)
NEUTROPHILS NFR BLD: 67 % (ref 36–65)
NEUTS SEG NFR BLD: 6.68 K/UL (ref 1.5–8.1)
NRBC BLD-RTO: 0 PER 100 WBC
PLATELET # BLD AUTO: ABNORMAL K/UL (ref 138–453)
PLATELET, FLUORESCENCE: ABNORMAL K/UL (ref 138–453)
RBC # BLD AUTO: 4.87 M/UL (ref 3.95–5.11)
RBC # BLD: ABNORMAL 10*6/UL
TIBC SERPL-MCNC: 305 UG/DL (ref 250–450)
UNSATURATED IRON BINDING CAPACITY: 269 UG/DL (ref 112–347)
WBC OTHER # BLD: 9.9 K/UL (ref 3.5–11.3)

## 2025-07-02 PROCEDURE — G8427 DOCREV CUR MEDS BY ELIG CLIN: HCPCS | Performed by: NURSE PRACTITIONER

## 2025-07-02 PROCEDURE — 99214 OFFICE O/P EST MOD 30 MIN: CPT | Performed by: NURSE PRACTITIONER

## 2025-07-02 PROCEDURE — 11982 REMOVE DRUG IMPLANT DEVICE: CPT | Performed by: NURSE PRACTITIONER

## 2025-07-02 PROCEDURE — 1036F TOBACCO NON-USER: CPT | Performed by: NURSE PRACTITIONER

## 2025-07-02 PROCEDURE — G8417 CALC BMI ABV UP PARAM F/U: HCPCS | Performed by: NURSE PRACTITIONER

## 2025-07-02 RX ORDER — DULAGLUTIDE 3 MG/.5ML
INJECTION, SOLUTION SUBCUTANEOUS
COMMUNITY
Start: 2025-06-13

## 2025-07-02 RX ORDER — ADALIMUMAB 4 MG/ML
KIT INJECTION
COMMUNITY
Start: 2025-05-30

## 2025-07-02 RX ORDER — FOLIC ACID 1 MG/1
TABLET ORAL
COMMUNITY
Start: 2025-05-06

## 2025-07-02 RX ORDER — RIZATRIPTAN BENZOATE 10 MG/1
TABLET ORAL
COMMUNITY
Start: 2025-07-02

## 2025-07-02 RX ORDER — TRANEXAMIC ACID 650 MG/1
1300 TABLET ORAL 3 TIMES DAILY
Qty: 30 TABLET | Refills: 0 | Status: SHIPPED | OUTPATIENT
Start: 2025-07-02

## 2025-07-02 RX ORDER — METHOTREXATE 2.5 MG/1
TABLET ORAL
COMMUNITY
Start: 2025-06-18

## 2025-07-02 NOTE — PROGRESS NOTES
24 y.o.  7/2/2025      Lucinda Buchanan is a 24 y.o. female is requesting to have her Nexplanon removed. She has been experiencing persistent, heavy/saturating bleeding for past two months. Bleeding through heavy overnight pads and her clothing frequently.  Reports when she get hot she will often get lightheaded. Desires removal today without trial of OCP override. Does not desire any form of cycle control at this time.      Past Medical History:   Diagnosis Date    Anxiety     Arthritis     Insulin resistance     Migraines     Psoriasis          Past Surgical History:   Procedure Laterality Date    TONSILLECTOMY AND ADENOIDECTOMY      TYMPANOSTOMY TUBE PLACEMENT         Family History   Problem Relation Age of Onset    Peripheral Vascular Disease Mother     Diabetes Mother     Deep Vein Thrombosis Mother     Cancer Maternal Uncle     Cancer Maternal Uncle         lymphoma    Diabetes Maternal Great Grandfather     Cancer Maternal Great Grandmother         unsure of kind       Social History     Tobacco Use    Smoking status: Never    Smokeless tobacco: Never   Vaping Use    Vaping status: Never Used   Substance Use Topics    Alcohol use: Never    Drug use: Never       Current Outpatient Medications on File Prior to Visit   Medication Sig Dispense Refill    TRULICITY 3 MG/0.5ML SOAJ       HUMIRA-PSORIASIS/UVEIT STARTER 80 MG/0.8ML & 40MG/0.4ML AJKT       folic acid (FOLVITE) 1 MG tablet TAKE 1 TABLET BY MOUTH ONCE DAILY FOR 90 DAYS      methotrexate (RHEUMATREX) 2.5 MG chemo tablet TAKE 4 TABLETS BY MOUTH ONCE A WEEK FOR 28 DAYS      rizatriptan (MAXALT) 10 MG tablet       SPIRIVA RESPIMAT 1.25 MCG/ACT AERS inhaler INHALE 2 SPRAY(S) BY MOUTH ONCE DAILY      Cholecalciferol (VITAMIN D3) 1.25 MG (00096 UT) CAPS Take 1 capsule by mouth Once a week at 5 PM      ibuprofen (ADVIL;MOTRIN) 800 MG tablet Take 1 tablet by mouth 3 times daily as needed for Pain      loratadine (CLARITIN) 10 MG tablet TAKE 1 TABLET BY

## 2025-07-03 ENCOUNTER — RESULTS FOLLOW-UP (OUTPATIENT)
Dept: OBGYN CLINIC | Age: 25
End: 2025-07-03

## 2025-08-13 ENCOUNTER — OFFICE VISIT (OUTPATIENT)
Dept: OBGYN CLINIC | Age: 25
End: 2025-08-13

## 2025-08-13 ENCOUNTER — HOSPITAL ENCOUNTER (OUTPATIENT)
Age: 25
Setting detail: SPECIMEN
Discharge: HOME | End: 2025-08-13

## 2025-08-13 VITALS
BODY MASS INDEX: 53.92 KG/M2 | WEIGHT: 293 LBS | DIASTOLIC BLOOD PRESSURE: 80 MMHG | SYSTOLIC BLOOD PRESSURE: 132 MMHG | HEIGHT: 62 IN

## 2025-08-13 DIAGNOSIS — Z01.419 WOMEN'S ANNUAL ROUTINE GYNECOLOGICAL EXAMINATION: Primary | ICD-10-CM

## 2025-08-13 DIAGNOSIS — I86.3 LABIAL VARICOSITIES: ICD-10-CM

## 2025-08-13 DIAGNOSIS — Z12.4 PAP SMEAR FOR CERVICAL CANCER SCREENING: ICD-10-CM

## 2025-08-13 RX ORDER — ADALIMUMAB 40MG/0.4ML
KIT SUBCUTANEOUS
COMMUNITY
Start: 2025-07-03

## 2025-08-13 RX ORDER — CEPHALEXIN 500 MG/1
500 CAPSULE ORAL 2 TIMES DAILY
Qty: 14 CAPSULE | Refills: 0 | Status: SHIPPED | OUTPATIENT
Start: 2025-08-13 | End: 2025-08-20

## 2025-08-29 LAB — CYTOLOGY REPORT: NORMAL
